# Patient Record
Sex: MALE | Race: WHITE | NOT HISPANIC OR LATINO | Employment: FULL TIME | URBAN - METROPOLITAN AREA
[De-identification: names, ages, dates, MRNs, and addresses within clinical notes are randomized per-mention and may not be internally consistent; named-entity substitution may affect disease eponyms.]

---

## 2023-12-27 ENCOUNTER — OFFICE VISIT (OUTPATIENT)
Dept: GASTROENTEROLOGY | Facility: CLINIC | Age: 38
End: 2023-12-27
Payer: COMMERCIAL

## 2023-12-27 VITALS
SYSTOLIC BLOOD PRESSURE: 136 MMHG | DIASTOLIC BLOOD PRESSURE: 88 MMHG | HEIGHT: 68 IN | TEMPERATURE: 98.2 F | WEIGHT: 192.8 LBS | OXYGEN SATURATION: 97 % | BODY MASS INDEX: 29.22 KG/M2 | HEART RATE: 70 BPM

## 2023-12-27 DIAGNOSIS — R11.0 NAUSEA: ICD-10-CM

## 2023-12-27 DIAGNOSIS — R10.13 DYSPEPSIA: Primary | ICD-10-CM

## 2023-12-27 DIAGNOSIS — R05.9 COUGH, UNSPECIFIED TYPE: ICD-10-CM

## 2023-12-27 DIAGNOSIS — K21.9 GASTROESOPHAGEAL REFLUX DISEASE, UNSPECIFIED WHETHER ESOPHAGITIS PRESENT: ICD-10-CM

## 2023-12-27 PROCEDURE — 99204 OFFICE O/P NEW MOD 45 MIN: CPT | Performed by: NURSE PRACTITIONER

## 2023-12-27 RX ORDER — SUCRALFATE 1 G/1
1 TABLET ORAL 3 TIMES DAILY
Qty: 270 TABLET | Refills: 3 | Status: SHIPPED | OUTPATIENT
Start: 2023-12-27

## 2023-12-27 RX ORDER — TADALAFIL 20 MG/1
10-20 TABLET ORAL
COMMUNITY
Start: 2022-12-30 | End: 2023-12-30

## 2023-12-27 RX ORDER — DEXLANSOPRAZOLE 60 MG/1
60 CAPSULE, DELAYED RELEASE ORAL DAILY
Qty: 30 CAPSULE | Refills: 5 | Status: SHIPPED | OUTPATIENT
Start: 2023-12-27

## 2023-12-27 RX ORDER — FLUVOXAMINE MALEATE 25 MG
100 TABLET ORAL
COMMUNITY
End: 2023-12-27

## 2023-12-27 RX ORDER — ATORVASTATIN CALCIUM 20 MG/1
20 TABLET, FILM COATED ORAL DAILY
COMMUNITY
Start: 2023-10-23 | End: 2023-12-27

## 2023-12-27 NOTE — PROGRESS NOTES
Chief Complaint   Patient presents with    Heartburn       HPI    Bruce Abdalla is a  38 y.o. male here with a history of hypertension to establish care as a new patient for complaints of dyspepsia.    This patient will also follow with Dr. Soto.    This patient was following with Dr. Rossi up until the summer 2022.  He had an EGD 7/2022 reviewed with normal findings.  Pathology negative for H. pylori.    Patient reports approximately 6 years of dyspeptic symptoms with chronic cough.  He has been evaluated by ENT and per his accounts workup was normal.  The last time he saw ear nose and throat was approximately 5 years ago.  He was placed on twice daily Protonix following EGD last summer but was able to taper down to as needed.  He had return of symptoms over the last several months with severe worsening of cough which prompted him to seek treatment in urgent care earlier this month.  He was treated with Carafate which he has found very helpful.  Mild nausea without vomiting.  No dysphagia or odynophagia.  He has been reading up about antireflux surgery but prefers to avoid surgical intervention if able.  No lower GI complaints.  No family history of colon cancer.    He works as a nurse here on the fifth floor.    Past Medical History:   Diagnosis Date    GERD (gastroesophageal reflux disease)     Hypertension        Past Surgical History:   Procedure Laterality Date    HERNIA REPAIR      UPPER GASTROINTESTINAL ENDOSCOPY  2022    [facility]       Scheduled Meds:     Continuous Infusions: No current facility-administered medications for this visit.      PRN Meds:     Allergies   Allergen Reactions    Bee Venom Swelling    Influenza Virus Vac Live Quad Hives       Social History     Socioeconomic History    Marital status:    Tobacco Use    Smoking status: Never    Smokeless tobacco: Never   Vaping Use    Vaping Use: Never used   Substance and Sexual Activity    Alcohol use: Never    Drug use: Never        Family History   Problem Relation Age of Onset    Heart disease Mother     Diabetes Father     Heart disease Father        Review of Systems   Respiratory:  Positive for cough.    Gastrointestinal:  Positive for nausea.        + Dyspepsia       Vitals:    12/27/23 1342   BP: 136/88   Pulse: 70   Temp: 98.2 °F (36.8 °C)   SpO2: 97%       Physical Exam  Constitutional:       Appearance: He is well-developed.   Abdominal:      General: Bowel sounds are normal. There is no distension.      Palpations: Abdomen is soft. There is no mass.      Tenderness: There is no abdominal tenderness. There is no guarding.      Hernia: No hernia is present.   Skin:     General: Skin is warm and dry.      Capillary Refill: Capillary refill takes less than 2 seconds.   Neurological:      Mental Status: He is alert and oriented to person, place, and time.   Psychiatric:         Behavior: Behavior normal.     Assessment    Diagnoses and all orders for this visit:    1. Dyspepsia (Primary)  -     FL Upper GI With Air Contrast; Future    2. Gastroesophageal reflux disease, unspecified whether esophagitis present  -     sucralfate (CARAFATE) 1 g tablet; Take 1 tablet by mouth 3 (Three) Times a Day.  Dispense: 270 tablet; Refill: 3    3. Cough, unspecified type  -     FL Upper GI With Air Contrast; Future    4. Nausea  -     FL Upper GI With Air Contrast; Future    Other orders  -     dexlansoprazole (Dexilant) 60 MG capsule; Take 1 capsule by mouth Daily.  Dispense: 30 capsule; Refill: 5    Plan    Schedule upper GI series to assess the extent of reflux  Stop Protonix  Trial of Dexilant 60 mg once daily  Continue Carafate refill provided  Antireflux measures and dietary modifications reviewed. Low acid diet reviewed. Keep head of bed elevated. Stop eating/drinking at least 3 hours prior to bedtime. Eliminate caffeine and carbonated beverages.  Weight loss encouraged if BMI over 25.  Further recommendations pending imaging           BHARAT Key  [facility]  [address] [place] [address]  Office: [phone]

## 2024-02-06 ENCOUNTER — HOSPITAL ENCOUNTER (OUTPATIENT)
Dept: GENERAL RADIOLOGY | Facility: HOSPITAL | Age: 39
Discharge: HOME | End: 2024-02-06
Admitting: NURSE PRACTITIONER
Payer: COMMERCIAL

## 2024-02-06 DIAGNOSIS — R10.13 DYSPEPSIA: ICD-10-CM

## 2024-02-06 DIAGNOSIS — R05.9 COUGH, UNSPECIFIED TYPE: ICD-10-CM

## 2024-02-06 DIAGNOSIS — R11.0 NAUSEA: ICD-10-CM

## 2024-02-06 PROCEDURE — 74246 X-RAY XM UPR GI TRC 2CNTRST: CPT

## 2024-02-06 RX ADMIN — BARIUM SULFATE 135 ML: 980 POWDER, FOR SUSPENSION ORAL at 10:49

## 2024-02-06 RX ADMIN — ANTACID/ANTIFLATULENT 1 PACKET: 380; 550; 10; 10 GRANULE, EFFERVESCENT ORAL at 10:49

## 2024-02-07 NOTE — PROGRESS NOTES
Please inform the patient imaging shows moderately severe GERD.  No strictures noted.  How would he feel about an updated EGD with Dr. Soto?

## 2024-02-08 ENCOUNTER — TELEPHONE (OUTPATIENT)
Dept: GASTROENTEROLOGY | Facility: CLINIC | Age: 39
End: 2024-02-08
Payer: COMMERCIAL

## 2024-02-08 RX ORDER — PANTOPRAZOLE SODIUM 40 MG/1
40 TABLET, DELAYED RELEASE ORAL 2 TIMES DAILY
Qty: 180 TABLET | Refills: 3 | Status: SHIPPED | OUTPATIENT
Start: 2024-02-08

## 2024-02-08 NOTE — TELEPHONE ENCOUNTER
Called pt and advised of Jessica PERSON's note.  Pt verbalized understanding.    Pt states he would like to talk to RAZA Lopez prior to scheduling an EGD.     F/u appt w/RAZA Lopez 3/6 @ 2pm.     Update sent to RAZA Lopez.

## 2024-02-08 NOTE — TELEPHONE ENCOUNTER
----- Message from BHARAT Key sent at 2/7/2024  3:39 PM EST -----  Please inform the patient imaging shows moderately severe GERD.  No strictures noted.  How would he feel about an updated EGD with Dr. Soto?

## 2024-02-19 ENCOUNTER — TELEPHONE (OUTPATIENT)
Dept: GASTROENTEROLOGY | Facility: CLINIC | Age: 39
End: 2024-02-19
Payer: COMMERCIAL

## 2024-02-19 NOTE — TELEPHONE ENCOUNTER
Called patients. He states he questions are     What is being looked for and what is she thinking could be diagnosis.  What is the treatment plan if what you are looking for is found.     Patient said he is not opposed to the EGD just wanted to ask the questions first.

## 2024-02-19 NOTE — TELEPHONE ENCOUNTER
Hub staff attempted to follow warm transfer process and was unsuccessful     Caller: Bruce Abdalla    Relationship to patient: Self    Best call back number: [phone]    Patient is needing: PT IS RETURNING CALL TO [facility] REGARDING THE QUESTIONS ABOUT THE EGD.     REQUESTING CALL BACK PLEASE    ORIGINAL MESSAGE 2/8/24

## 2024-02-21 ENCOUNTER — TELEPHONE (OUTPATIENT)
Dept: GASTROENTEROLOGY | Facility: CLINIC | Age: 39
End: 2024-02-21
Payer: COMMERCIAL

## 2024-02-21 DIAGNOSIS — R11.0 NAUSEA: ICD-10-CM

## 2024-02-21 DIAGNOSIS — K21.9 GASTROESOPHAGEAL REFLUX DISEASE, UNSPECIFIED WHETHER ESOPHAGITIS PRESENT: Primary | ICD-10-CM

## 2024-02-21 DIAGNOSIS — R93.3 ABNORMAL FINDING ON GI TRACT IMAGING: ICD-10-CM

## 2024-02-21 DIAGNOSIS — R05.9 COUGH, UNSPECIFIED TYPE: ICD-10-CM

## 2024-02-21 DIAGNOSIS — R31.9 HEMATURIA, UNSPECIFIED TYPE: ICD-10-CM

## 2024-02-21 DIAGNOSIS — R10.13 DYSPEPSIA: ICD-10-CM

## 2024-02-21 NOTE — TELEPHONE ENCOUNTER
Spoke with the patient over the phone regarding symptoms and imaging.  Please schedule him for an EGD with Dr. Soto.  He also asked that I check his urine for blood as he had a urinalysis back in December which showed hematuria.  UA orders are in.

## 2024-02-21 NOTE — TELEPHONE ENCOUNTER
URIEL SALEH IN SCHEDULING PT SCHEDULED 06/10/2024 ARRIVING AT 2:30PM EGD PREP INSTRUCTIONS MAILED TO ADDRESS ON FILE VERIFIED BY THE PT.OK FOR THE HUB TO READ

## 2024-03-22 ENCOUNTER — TELEPHONE (OUTPATIENT)
Dept: GASTROENTEROLOGY | Facility: CLINIC | Age: 39
End: 2024-03-22
Payer: COMMERCIAL

## 2024-06-10 ENCOUNTER — ANESTHESIA EVENT (OUTPATIENT)
Dept: GASTROENTEROLOGY | Facility: HOSPITAL | Age: 39
End: 2024-06-10
Payer: COMMERCIAL

## 2024-06-10 ENCOUNTER — HOSPITAL ENCOUNTER (OUTPATIENT)
Facility: HOSPITAL | Age: 39
Setting detail: HOSPITAL OUTPATIENT SURGERY
Discharge: HOME | End: 2024-06-10
Attending: INTERNAL MEDICINE | Admitting: INTERNAL MEDICINE
Payer: COMMERCIAL

## 2024-06-10 ENCOUNTER — ANESTHESIA (OUTPATIENT)
Dept: GASTROENTEROLOGY | Facility: HOSPITAL | Age: 39
End: 2024-06-10
Payer: COMMERCIAL

## 2024-06-10 VITALS
BODY MASS INDEX: 28.49 KG/M2 | RESPIRATION RATE: 16 BRPM | HEIGHT: 68 IN | WEIGHT: 188 LBS | DIASTOLIC BLOOD PRESSURE: 74 MMHG | SYSTOLIC BLOOD PRESSURE: 106 MMHG | OXYGEN SATURATION: 98 % | HEART RATE: 56 BPM

## 2024-06-10 DIAGNOSIS — R10.13 DYSPEPSIA: ICD-10-CM

## 2024-06-10 DIAGNOSIS — K21.9 GASTROESOPHAGEAL REFLUX DISEASE, UNSPECIFIED WHETHER ESOPHAGITIS PRESENT: ICD-10-CM

## 2024-06-10 DIAGNOSIS — R11.0 NAUSEA: ICD-10-CM

## 2024-06-10 DIAGNOSIS — R05.9 COUGH, UNSPECIFIED TYPE: ICD-10-CM

## 2024-06-10 DIAGNOSIS — R93.3 ABNORMAL FINDING ON GI TRACT IMAGING: ICD-10-CM

## 2024-06-10 PROCEDURE — 25010000002 PROPOFOL 1000 MG/100ML EMULSION: Performed by: NURSE ANESTHETIST, CERTIFIED REGISTERED

## 2024-06-10 PROCEDURE — 43239 EGD BIOPSY SINGLE/MULTIPLE: CPT | Performed by: INTERNAL MEDICINE

## 2024-06-10 PROCEDURE — 88342 IMHCHEM/IMCYTCHM 1ST ANTB: CPT | Performed by: INTERNAL MEDICINE

## 2024-06-10 PROCEDURE — 88305 TISSUE EXAM BY PATHOLOGIST: CPT | Performed by: INTERNAL MEDICINE

## 2024-06-10 PROCEDURE — S0260 H&P FOR SURGERY: HCPCS | Performed by: INTERNAL MEDICINE

## 2024-06-10 PROCEDURE — 25810000003 LACTATED RINGERS PER 1000 ML: Performed by: INTERNAL MEDICINE

## 2024-06-10 RX ORDER — IPRATROPIUM BROMIDE AND ALBUTEROL SULFATE 2.5; .5 MG/3ML; MG/3ML
3 SOLUTION RESPIRATORY (INHALATION) ONCE AS NEEDED
Status: DISCONTINUED | OUTPATIENT
Start: 2024-06-10 | End: 2024-06-10 | Stop reason: HOSPADM

## 2024-06-10 RX ORDER — DROPERIDOL 2.5 MG/ML
0.62 INJECTION, SOLUTION INTRAMUSCULAR; INTRAVENOUS
Status: DISCONTINUED | OUTPATIENT
Start: 2024-06-10 | End: 2024-06-10 | Stop reason: HOSPADM

## 2024-06-10 RX ORDER — LIDOCAINE HYDROCHLORIDE 20 MG/ML
INJECTION, SOLUTION INFILTRATION; PERINEURAL AS NEEDED
Status: DISCONTINUED | OUTPATIENT
Start: 2024-06-10 | End: 2024-06-10 | Stop reason: SURG

## 2024-06-10 RX ORDER — EPHEDRINE SULFATE 50 MG/ML
5 INJECTION, SOLUTION INTRAVENOUS ONCE AS NEEDED
Status: DISCONTINUED | OUTPATIENT
Start: 2024-06-10 | End: 2024-06-10 | Stop reason: HOSPADM

## 2024-06-10 RX ORDER — FLUMAZENIL 0.1 MG/ML
0.2 INJECTION INTRAVENOUS AS NEEDED
Status: DISCONTINUED | OUTPATIENT
Start: 2024-06-10 | End: 2024-06-10 | Stop reason: HOSPADM

## 2024-06-10 RX ORDER — PROMETHAZINE HYDROCHLORIDE 25 MG/1
25 SUPPOSITORY RECTAL ONCE AS NEEDED
Status: DISCONTINUED | OUTPATIENT
Start: 2024-06-10 | End: 2024-06-10 | Stop reason: HOSPADM

## 2024-06-10 RX ORDER — PROMETHAZINE HYDROCHLORIDE 25 MG/1
25 TABLET ORAL ONCE AS NEEDED
Status: DISCONTINUED | OUTPATIENT
Start: 2024-06-10 | End: 2024-06-10 | Stop reason: HOSPADM

## 2024-06-10 RX ORDER — HYDROCODONE BITARTRATE AND ACETAMINOPHEN 5; 325 MG/1; MG/1
1 TABLET ORAL ONCE AS NEEDED
Status: DISCONTINUED | OUTPATIENT
Start: 2024-06-10 | End: 2024-06-10 | Stop reason: HOSPADM

## 2024-06-10 RX ORDER — OXYCODONE AND ACETAMINOPHEN 7.5; 325 MG/1; MG/1
1 TABLET ORAL EVERY 4 HOURS PRN
Status: DISCONTINUED | OUTPATIENT
Start: 2024-06-10 | End: 2024-06-10 | Stop reason: HOSPADM

## 2024-06-10 RX ORDER — HYDRALAZINE HYDROCHLORIDE 20 MG/ML
5 INJECTION INTRAMUSCULAR; INTRAVENOUS
Status: DISCONTINUED | OUTPATIENT
Start: 2024-06-10 | End: 2024-06-10 | Stop reason: HOSPADM

## 2024-06-10 RX ORDER — SODIUM CHLORIDE, SODIUM LACTATE, POTASSIUM CHLORIDE, CALCIUM CHLORIDE 600; 310; 30; 20 MG/100ML; MG/100ML; MG/100ML; MG/100ML
1000 INJECTION, SOLUTION INTRAVENOUS CONTINUOUS
Status: DISCONTINUED | OUTPATIENT
Start: 2024-06-10 | End: 2024-06-10 | Stop reason: HOSPADM

## 2024-06-10 RX ORDER — HYDROMORPHONE HYDROCHLORIDE 2 MG/ML
0.5 INJECTION, SOLUTION INTRAMUSCULAR; INTRAVENOUS; SUBCUTANEOUS
Status: DISCONTINUED | OUTPATIENT
Start: 2024-06-10 | End: 2024-06-10 | Stop reason: HOSPADM

## 2024-06-10 RX ORDER — DIPHENHYDRAMINE HYDROCHLORIDE 50 MG/ML
12.5 INJECTION INTRAMUSCULAR; INTRAVENOUS
Status: DISCONTINUED | OUTPATIENT
Start: 2024-06-10 | End: 2024-06-10 | Stop reason: HOSPADM

## 2024-06-10 RX ORDER — NALOXONE HCL 0.4 MG/ML
0.2 VIAL (ML) INJECTION AS NEEDED
Status: DISCONTINUED | OUTPATIENT
Start: 2024-06-10 | End: 2024-06-10 | Stop reason: HOSPADM

## 2024-06-10 RX ORDER — FENTANYL CITRATE 50 UG/ML
50 INJECTION, SOLUTION INTRAMUSCULAR; INTRAVENOUS
Status: DISCONTINUED | OUTPATIENT
Start: 2024-06-10 | End: 2024-06-10 | Stop reason: HOSPADM

## 2024-06-10 RX ORDER — PROPOFOL 10 MG/ML
INJECTION, EMULSION INTRAVENOUS AS NEEDED
Status: DISCONTINUED | OUTPATIENT
Start: 2024-06-10 | End: 2024-06-10 | Stop reason: SURG

## 2024-06-10 RX ORDER — LABETALOL HYDROCHLORIDE 5 MG/ML
5 INJECTION, SOLUTION INTRAVENOUS
Status: DISCONTINUED | OUTPATIENT
Start: 2024-06-10 | End: 2024-06-10 | Stop reason: HOSPADM

## 2024-06-10 RX ORDER — ONDANSETRON 2 MG/ML
4 INJECTION INTRAMUSCULAR; INTRAVENOUS ONCE AS NEEDED
Status: DISCONTINUED | OUTPATIENT
Start: 2024-06-10 | End: 2024-06-10 | Stop reason: HOSPADM

## 2024-06-10 RX ADMIN — PROPOFOL INJECTABLE EMULSION 100 MG: 10 INJECTION, EMULSION INTRAVENOUS at 10:19

## 2024-06-10 RX ADMIN — SODIUM CHLORIDE, POTASSIUM CHLORIDE, SODIUM LACTATE AND CALCIUM CHLORIDE 1000 ML: 600; 310; 30; 20 INJECTION, SOLUTION INTRAVENOUS at 09:22

## 2024-06-10 RX ADMIN — PROPOFOL INJECTABLE EMULSION 140 MCG/KG/MIN: 10 INJECTION, EMULSION INTRAVENOUS at 10:20

## 2024-06-10 RX ADMIN — LIDOCAINE HYDROCHLORIDE 60 MG: 20 INJECTION, SOLUTION INFILTRATION; PERINEURAL at 10:15

## 2024-06-10 NOTE — ANESTHESIA PREPROCEDURE EVALUATION
Anesthesia Evaluation     Patient summary reviewed and Nursing notes reviewed                Airway   Mallampati: II  TM distance: >3 FB  Neck ROM: full  Dental      Pulmonary - negative pulmonary ROS   Cardiovascular     Patient on routine beta blocker  Rhythm: regular  Rate: normal    (+) hypertension      Neuro/Psych- negative ROS  GI/Hepatic/Renal/Endo    (+) obesity, GERD    Musculoskeletal (-) negative ROS    Abdominal    Substance History - negative use     OB/GYN negative ob/gyn ROS         Other                      Anesthesia Plan    ASA 2     MAC     intravenous induction     Anesthetic plan, risks, benefits, and alternatives have been provided, discussed and informed consent has been obtained with: patient.    CODE STATUS:

## 2024-06-10 NOTE — ANESTHESIA POSTPROCEDURE EVALUATION
Patient: Bruce Abdalla    Procedure Summary       Date: 06/10/24 Room / Location: [facility] ENDOSCOPY 8 / [facility] ENDOSCOPY    Anesthesia Start: 1013 Anesthesia Stop: 1036    Procedure: ESOPHAGOGASTRODUODENOSCOPY with cold forcep biopsies (Esophagus) Diagnosis:       Gastroesophageal reflux disease, unspecified whether esophagitis present      Dyspepsia      Cough, unspecified type      Nausea      Abnormal finding on GI tract imaging      Gastritis      (Gastroesophageal reflux disease, unspecified whether esophagitis present [K21.9])      (Dyspepsia [R10.13])      (Cough, unspecified type [R05.9])      (Nausea [R11.0])      (Abnormal finding on GI tract imaging [R93.3])    Surgeons: Castro Soto MD Provider: Segun Neal MD    Anesthesia Type: MAC ASA Status: 2            Anesthesia Type: MAC    Vitals  Vitals Value Taken Time   /79 06/10/24 1033   Temp     Pulse 59 06/10/24 1033   Resp 16 06/10/24 1033   SpO2 97 % 06/10/24 1033           Post Anesthesia Care and Evaluation    Patient location during evaluation: PACU  Patient participation: complete - patient participated  Level of consciousness: awake and alert  Pain management: adequate    Airway patency: patent  Anesthetic complications: No anesthetic complications    Cardiovascular status: acceptable  Respiratory status: acceptable  Hydration status: acceptable    Comments: --------------------            06/10/24               1033     --------------------   BP:       109/79     Pulse:      59       Resp:       16       SpO2:      97%      --------------------

## 2024-06-10 NOTE — BRIEF OP NOTE
ESOPHAGOGASTRODUODENOSCOPY  Progress Note    Bruce Abdalla  6/10/2024    Pre-op Diagnosis:   Gastroesophageal reflux disease, unspecified whether esophagitis present [K21.9]  Dyspepsia [R10.13]  Cough, unspecified type [R05.9]  Nausea [R11.0]  Abnormal finding on GI tract imaging [R93.3]       Post-Op Diagnosis Codes:     * Gastroesophageal reflux disease, unspecified whether esophagitis present [K21.9]     * Dyspepsia [R10.13]     * Cough, unspecified type [R05.9]     * Nausea [R11.0]     * Abnormal finding on GI tract imaging [R93.3]     * Gastritis [K29.70]    Procedure/CPT® Codes:        Procedure(s):  ESOPHAGOGASTRODUODENOSCOPY with cold forcep biopsies              Surgeon(s):  Castro Soto MD    Anesthesia: Monitored Anesthesia Care    Staff:   Endo Technician: Addis Peters  Endo Nurse: Angelique Velazquez RN         Estimated Blood Loss: minimal    Urine Voided: * No values recorded between 6/10/2024 10:13 AM and 6/10/2024 10:28 AM *    Specimens:                Specimens       ID Source Type Tests Collected By Collected At Frozen?    A Gastric, Antrum Tissue TISSUE PATHOLOGY EXAM   Castro Soto MD 6/10/24 1026 No    Description: biposies antral    This specimen was not marked as sent.                  Drains: * No LDAs found *    Findings: EGD revealed normal esophageal mucosa as well as duodenal mucosa.  Retroflexed view the GE junction was normal with mild antral gastritis noted biopsies of the antrum were obtained.        Complications: None          Castro Soto MD     Date: 6/10/2024  Time: 10:30 EDT

## 2024-06-10 NOTE — H&P
[facility]  Pre Procedure History & Physical    Chief Complaint:   GERD    Subjective     HPI:   Patient 39-year-old male with history of hypertension and GERD here for evaluation.  Patient with worsening of his baseline GERD though has noted improvement since restarting pantoprazole here for EGD.    Past Medical History:   Past Medical History:   Diagnosis Date    GERD (gastroesophageal reflux disease)     Hypertension        Past Surgical History:  Past Surgical History:   Procedure Laterality Date    HERNIA REPAIR      UPPER GASTROINTESTINAL ENDOSCOPY  2022    [facility]       Family History:  Family History   Problem Relation Age of Onset    Heart disease Mother     Diabetes Father     Heart disease Father     Malig Hyperthermia Neg Hx        Social History:   reports that he has never smoked. He has never used smokeless tobacco. He reports that he does not drink alcohol and does not use drugs.    Medications:   Medications Prior to Admission   Medication Sig Dispense Refill Last Dose    amphetamine-dextroamphetamine (Adderall) 20 MG tablet Take 0.5 tablets by mouth 2 (Two) Times a Day. 30 tablet 0 6/9/2024    atenolol (TENORMIN) 25 MG tablet Take 1 tablet by mouth Daily. 90 tablet 3 6/10/2024    cloNIDine (CATAPRES) 0.2 MG tablet Take 1 tablet by mouth every night at bedtime. 30 tablet 4 6/9/2024    Daridorexant HCl (Quviviq) 50 MG tablet TAKE 1 TABLET BY MOUTH AT BEDTIME 30 tablet 2 6/9/2024    pantoprazole (PROTONIX) 40 MG EC tablet Take 1 tablet by mouth 2 (Two) Times a Day. 180 tablet 3 6/9/2024    sucralfate (CARAFATE) 1 g tablet Take 1 tablet by mouth 3 (Three) Times a Day. 270 tablet 3 Past Week    SUMAtriptan (IMITREX) 100 MG tablet Take 1 tablet by mouth at onset of migraine. May repeat in 2 hours if needed. Max 2 tablets/24 hours 9 tablet 1 Past Week    tadalafil (CIALIS) 20 MG tablet Take 10-20 mg by mouth.   Past Month    betamethasone dipropionate (DIPROSONE) 0.05 % cream  "Apply 1 Application topically to the appropriate area as directed 2 (Two) Times a Day. 30 g 1 6/8/2024    traZODone (DESYREL) 50 MG tablet Take 0.5-1 tablets by mouth every night at bedtime as needed for insomnia 30 tablet 5 6/8/2024       Allergies:  Bee venom and Influenza virus vac live quad    ROS:    Pertinent items are noted in HPI     Objective     Blood pressure 112/87, pulse 66, resp. rate 16, height 172.7 cm (68\"), weight 85.3 kg (188 lb), SpO2 98%.    Physical Exam   Constitutional: Pt is oriented to person, place, and time and well-developed, well-nourished, and in no distress.   Mouth/Throat: Oropharynx is clear and moist.   Neck: Normal range of motion.   Cardiovascular: Normal rate, regular rhythm and normal heart sounds.    Pulmonary/Chest: Effort normal and breath sounds normal.   Abdominal: Soft. Nontender  Skin: Skin is warm and dry.   Psychiatric: Mood, memory, affect and judgment normal.     Assessment & Plan     Diagnosis:  GERD    Anticipated Surgical Procedure:  EGD    The risks, benefits, and alternatives of this procedure have been discussed with the patient or the responsible party- the patient understands and agrees to proceed.                                                          "

## 2024-06-12 LAB
LAB AP CASE REPORT: NORMAL
LAB AP SPECIAL STAINS: NORMAL
PATH REPORT.FINAL DX SPEC: NORMAL
PATH REPORT.GROSS SPEC: NORMAL

## 2024-07-11 ENCOUNTER — TELEPHONE (OUTPATIENT)
Dept: GASTROENTEROLOGY | Facility: CLINIC | Age: 39
End: 2024-07-11
Payer: COMMERCIAL

## 2024-07-11 RX ORDER — LANSOPRAZOLE 30 MG/1
30 CAPSULE, DELAYED RELEASE ORAL DAILY
Qty: 90 CAPSULE | Refills: 3 | Status: SHIPPED | OUTPATIENT
Start: 2024-07-11

## 2024-07-11 NOTE — TELEPHONE ENCOUNTER
Patient called. Advised as per Dr. Soto's note. He verb understanding. Switch patient from Pantoprazole to Lansoprazole. Patient verb understanding.

## 2024-07-11 NOTE — TELEPHONE ENCOUNTER
----- Message from Castro Soto sent at 7/11/2024  1:56 PM EDT -----  Erosive gastritis, change PPI to one not tried before.  Check with patient

## 2024-08-30 ENCOUNTER — TELEPHONE (OUTPATIENT)
Dept: GASTROENTEROLOGY | Facility: CLINIC | Age: 39
End: 2024-08-30
Payer: COMMERCIAL

## 2024-08-30 RX ORDER — PANTOPRAZOLE SODIUM 40 MG/1
40 TABLET, DELAYED RELEASE ORAL DAILY
Qty: 90 TABLET | Refills: 3 | Status: SHIPPED | OUTPATIENT
Start: 2024-08-30

## 2024-08-30 NOTE — TELEPHONE ENCOUNTER
----- Message from Jessica Moscoso sent at 8/30/2024  1:38 PM EDT -----  Regarding: FW: PREVACID not working  Contact: [phone]  Protonix sent to his pharmacy schedule him for a follow-up as well      ----- Message -----  From: Irasema Royal RN  Sent: 8/29/2024   8:09 AM EDT  To: BHARAT Key  Subject: FW: PREVACID not working                           ----- Message -----  From: Bruce Abdalla  Sent: 8/28/2024   2:40 PM EDT  To: [facility]  Subject: PREVACID not working                             Isra Lopez,  I was switched to Prevacid about 2 months ago from my protonix and I don't think it is working well.  My cough came back pretty bad along with heart burn about 3 weeks after switching so I had to go back to using the carafate 4 times a day. I got it under control but as soon as I cut back on the carafate the symptoms returned so I started taking my protonix again. I am still using the carafate occasionally with the protonix but overall it seems to be working better the last 2 weeks. However, I am about to run out of protonix so unless you all want to try me on something else, I will need some refills for the protonix please.    Thanks  Bruce Abdalla  [phone]

## 2024-09-03 ENCOUNTER — TELEPHONE (OUTPATIENT)
Dept: GASTROENTEROLOGY | Facility: CLINIC | Age: 39
End: 2024-09-03
Payer: COMMERCIAL

## 2024-10-13 ENCOUNTER — APPOINTMENT (OUTPATIENT)
Dept: CT IMAGING | Facility: HOSPITAL | Age: 39
End: 2024-10-13
Payer: COMMERCIAL

## 2024-10-13 ENCOUNTER — HOSPITAL ENCOUNTER (EMERGENCY)
Facility: HOSPITAL | Age: 39
Discharge: HOME | End: 2024-10-13
Attending: EMERGENCY MEDICINE | Admitting: EMERGENCY MEDICINE
Payer: COMMERCIAL

## 2024-10-13 VITALS
BODY MASS INDEX: 27.28 KG/M2 | DIASTOLIC BLOOD PRESSURE: 110 MMHG | RESPIRATION RATE: 18 BRPM | WEIGHT: 180 LBS | HEART RATE: 84 BPM | HEIGHT: 68 IN | TEMPERATURE: 98 F | OXYGEN SATURATION: 98 % | SYSTOLIC BLOOD PRESSURE: 148 MMHG

## 2024-10-13 DIAGNOSIS — I10 ELEVATED BLOOD PRESSURE READING WITH DIAGNOSIS OF HYPERTENSION: ICD-10-CM

## 2024-10-13 DIAGNOSIS — R59.0 LYMPHADENOPATHY OF HEAD AND NECK REGION: ICD-10-CM

## 2024-10-13 DIAGNOSIS — R20.0 LEFT FACIAL NUMBNESS: Primary | ICD-10-CM

## 2024-10-13 PROCEDURE — 99284 EMERGENCY DEPT VISIT MOD MDM: CPT

## 2024-10-13 PROCEDURE — 70450 CT HEAD/BRAIN W/O DYE: CPT

## 2024-10-13 PROCEDURE — 99283 EMERGENCY DEPT VISIT LOW MDM: CPT | Performed by: NURSE PRACTITIONER

## 2024-10-13 NOTE — DISCHARGE INSTRUCTIONS
CT head normal today.  Due to the numbness being present for a few weeks this is NOT worrisome for acute stroke.  Your neurological exam is normal.    Follow up with Primary Care Provider    Please keep eye on your blood pressure.    Return Precautions    Although you are being discharged from the ED today, I encourage you to return for worsening symptoms.  Things can, and do, change such that treatment at home with medication may not be adequate.      Specifically, return for any of the following:    Chest pain, shortness of breath, pain or nausea and vomiting not controlled by medications provided.    Please make a follow up with your Primary Care Provider for a blood pressure recheck.

## 2024-10-13 NOTE — FSED PROVIDER NOTE
EMERGENCY DEPARTMENT ENCOUNTER    Room Number:  05/05  Date seen:  10/13/2024  Time seen: 08:54 EDT  PCP: Lissy Brown PA  Historian: patient    Discussed/obtained information from independent historians: n/a    HPI:  Chief complaint:lymph node swelling, facial numbness  A complete HPI/ROS/PMH/PSH/SH/FH are unobtainable due to: n/a  Context:Bruec Abdalla is a 39 y.o. male with history of GERD, hypertension who presents to the ED with c/o a couple weeks of left facial numbness that is intermittent as well as a swollen and tender lymph node to back of left occipital area.  It is or not made better or worse by anything.  He denies any pain, headache, sore throat, URI symptoms, sinus congestion, unilateral weakness, visual changes.  He states he was seen the other day at his primary care provider and told just to watch it.  Today he became more concerned and wanted to make sure he did not have a brain tumor or that he was having a stroke.  He has not had these problems before.    External (non-ED) record review: Patient is status post EGD on 6/10/2024.  Postop diagnosis code include GERD, dyspepsia, cough, nausea and gastritis    Chronic or social conditions impacting care: Not applicable    ALLERGIES  Bee venom and Influenza virus vac live quad    PAST MEDICAL HISTORY  Active Ambulatory Problems     Diagnosis Date Noted    Gastroesophageal reflux disease 02/21/2024    Dyspepsia 02/21/2024    Cough 02/21/2024    Nausea 02/21/2024    Abnormal finding on GI tract imaging 02/21/2024     Resolved Ambulatory Problems     Diagnosis Date Noted    No Resolved Ambulatory Problems     Past Medical History:   Diagnosis Date    GERD (gastroesophageal reflux disease)     Hypertension        PAST SURGICAL HISTORY  Past Surgical History:   Procedure Laterality Date    ENDOSCOPY N/A 6/10/2024    Procedure: ESOPHAGOGASTRODUODENOSCOPY with cold forcep biopsies;  Surgeon: Castro Soto MD;  Location: [facility] ENDOSCOPY;   Service: Gastroenterology;  Laterality: N/A;  pre reflux  post gastritis    HERNIA REPAIR      UPPER GASTROINTESTINAL ENDOSCOPY  2022    [facility]       FAMILY HISTORY  Family History   Problem Relation Age of Onset    Heart disease Mother     Diabetes Father     Heart disease Father     Malig Hyperthermia Neg Hx        SOCIAL HISTORY  Social History     Socioeconomic History    Marital status:    Tobacco Use    Smoking status: Never    Smokeless tobacco: Never   Vaping Use    Vaping status: Never Used   Substance and Sexual Activity    Alcohol use: Never    Drug use: Never    Sexual activity: Defer       REVIEW OF SYSTEMS  Review of Systems    All systems reviewed and negative except for those discussed in HPI.     PHYSICAL EXAM    I have reviewed the triage vital signs and nursing notes.  Vitals:    10/13/24 0846   BP: (!) 148/110   Pulse: 75   Resp: 18   Temp: 98 °F (36.7 °C)   SpO2: 100%     Physical Exam    GENERAL: not distressed  HENT: nares patent, mucous membranes are moist.  There is no facial droop.  No tonsillar erythema, edema or exudates.  Voice is normal.  The tympanic membranes are normal.  There is perhaps a very tiny left occipital lymph node  EYES: no scleral icterus, PERRL, EOMI  NECK: no ROM limitations  CV: regular rhythm, regular rate  RESPIRATORY: normal effort, clear to auscultate bilaterally  ABDOMEN: soft  : deferred  MUSCULOSKELETAL: no deformity  NEURO: alert, moves all extremities, follows commands.  No weakness no drift no ataxia  SKIN: warm, dry    RADIOLOGY RESULTS  CT Head Without Contrast    Result Date: 10/13/2024  CT HEAD WO CONTRAST-  INDICATION: Occipital swelling, left facial numbness  COMPARISON: None  TECHNIQUE: Routine CT head without IV contrast. Coronal and sagittal reformats. Radiation dose reduction techniques were utilized, including automated exposure control and exposure modulation based on body size.  FINDINGS:  Brain: No intraparenchymal hemorrhage.  Preserved gray-white differentiation. No mass effect or midline shift.  Ventricles: No hydrocephalus. No intraventricular hemorrhage.  Extra-axial spaces: No extra-axial hemorrhage or fluid collection.  Osseous structures: Hyperostosis frontalis interna. No fracture.  Sinuses: Patent mastoid air cells and middle ears. Patent paranasal sinuses.      No acute intracranial process.  This report was finalized on 10/13/2024 9:19 AM by Dr. Sourav Correa M.D on Workstation: [id number]        Ordered the above noted radiological studies.  Independently interpreted by me.  My findings will be discussed in the medical decision section below.     PROGRESS, DATA ANALYSIS, CONSULTS AND MEDICAL DECISION MAKING    Please note that this section constitutes my independent interpretation of clinical data including lab results, radiology, EKG's.  This constitutes my independent professional opinion regarding differential diagnosis and management of this patient.  It may include any factors such as history from outside sources, review of external records, social determinants of health, management of medications, response to those treatments, and discussions with other providers.       Orders placed during this visit:  Orders Placed This Encounter   Procedures    CT Head Without Contrast    ED Acknowledgement Form Needed;            Medical Decision Making  Patient presents with several weeks of intermittent left-sided facial numbness, mild discomfort and also an occipital lymph node on the left that has been bothering the patient.  The patient is concern for stroke.  He has a normal, nonfocal neurological exam.  Given that symptoms have been present for several weeks he is obviously outside of any window for tPA/TNK and he has no other symptoms at all of stroke including visual deficits, unilateral weakness, problems thinking or speaking or headache.  Will have him follow-up with his primary care provider.  I did consider Bell's  palsy but he does not have this on exam.  CT head performed and unremarkable.  He has no symptoms of upper respiratory infection, sinusitis or viral syndrome.        DIAGNOSIS  Final diagnoses:   Left facial numbness, intermittent   Lymphadenopathy of head and neck region   Elevated blood pressure reading with diagnosis of hypertension          Medication List      No changes were made to your prescriptions during this visit.         FOLLOW-UP  Lissy Brown PA  [address]  [address]  [facility] [address]  [phone]    Schedule an appointment as soon as possible for a visit in 2 days  As needed, If symptoms worsen        Latest Documented Vital Signs:  As of 09:27 EDT  BP- (!) 148/110 HR- 75 Temp- 98 °F (36.7 °C) (Oral) O2 sat- 100%    Appropriate PPE utilized throughout this patient encounter to include mask, if indicated, per current protocol. Hand hygiene was performed before donning PPE and after removal when leaving the room.    Please note that portions of this were completed with a voice recognition program.     Note Disclaimer: At [facility], we believe that sharing information builds trust and better relationships. You are receiving this note because you are receiving care at [facility] or recently visited. It is possible you will see health information before a provider has talked with you about it. This kind of information can be easy to misunderstand. To help you fully understand what it means for your health, we urge you to discuss this note with your provider.

## 2024-10-16 ENCOUNTER — TRANSCRIBE ORDERS (OUTPATIENT)
Dept: ADMINISTRATIVE | Facility: HOSPITAL | Age: 39
End: 2024-10-16
Payer: COMMERCIAL

## 2024-10-16 DIAGNOSIS — R20.0 LEFT FACIAL NUMBNESS: ICD-10-CM

## 2024-10-16 DIAGNOSIS — R59.1 LYMPHADENOPATHY: Primary | ICD-10-CM

## 2024-10-22 ENCOUNTER — HOSPITAL ENCOUNTER (OUTPATIENT)
Dept: ULTRASOUND IMAGING | Facility: HOSPITAL | Age: 39
Discharge: HOME | End: 2024-10-22
Admitting: PHYSICIAN ASSISTANT
Payer: COMMERCIAL

## 2024-10-22 DIAGNOSIS — R20.0 LEFT FACIAL NUMBNESS: ICD-10-CM

## 2024-10-22 DIAGNOSIS — R59.1 LYMPHADENOPATHY: ICD-10-CM

## 2024-10-22 PROCEDURE — 76536 US EXAM OF HEAD AND NECK: CPT

## 2025-01-03 DIAGNOSIS — K21.9 GASTROESOPHAGEAL REFLUX DISEASE, UNSPECIFIED WHETHER ESOPHAGITIS PRESENT: ICD-10-CM

## 2025-01-03 RX ORDER — SUCRALFATE 1 G/1
1 TABLET ORAL 3 TIMES DAILY
Qty: 270 TABLET | Refills: 3 | Status: CANCELLED | OUTPATIENT
Start: 2025-01-03

## 2025-02-04 DIAGNOSIS — K21.9 GASTROESOPHAGEAL REFLUX DISEASE, UNSPECIFIED WHETHER ESOPHAGITIS PRESENT: ICD-10-CM

## 2025-02-05 RX ORDER — SUCRALFATE 1 G/1
1 TABLET ORAL 3 TIMES DAILY
Qty: 270 TABLET | Refills: 3 | OUTPATIENT
Start: 2025-02-05

## 2025-03-06 ENCOUNTER — TELEPHONE (OUTPATIENT)
Dept: GASTROENTEROLOGY | Facility: CLINIC | Age: 40
End: 2025-03-06
Payer: COMMERCIAL

## 2025-03-06 DIAGNOSIS — K21.9 GASTROESOPHAGEAL REFLUX DISEASE, UNSPECIFIED WHETHER ESOPHAGITIS PRESENT: ICD-10-CM

## 2025-03-06 NOTE — TELEPHONE ENCOUNTER
Hub staff attempted to follow warm transfer process and was unsuccessful     Caller: Bruce Abdalla    Relationship to patient: Self    Best call back number:  [phone]    Patient is needing: PT IS RETURNING CALL TO OFFICE REGARDING MEDICATION CARAFATE, PT STATES THAT HE IS USING THE MEDICATION AND IS NEED OF A REFILL.

## 2025-03-07 NOTE — TELEPHONE ENCOUNTER
Called pt, he states he still takes sucralfate several times per week for reflux sx.  States he is still having a cough r/t reflux some mornings. Pt takes pantoprazole at night, states he feels like it works best for nighttime sx.  Pt reports overall feeling better on pantoprazole, but having daytime reflux sx.  Pt states in the past he has tried nexium w/o relief and Dexilant was not covered by ins.  Advised will send a msg to RAZA Lopez.

## 2025-03-10 RX ORDER — SUCRALFATE 1 G/1
1 TABLET ORAL 3 TIMES DAILY
Qty: 270 TABLET | Refills: 3 | Status: SHIPPED | OUTPATIENT
Start: 2025-03-10

## 2025-03-10 NOTE — TELEPHONE ENCOUNTER
Ok for the hub to relay and schedule.  Left vm for the pt to call back and schedule an annual follow up appt with Jessica Moscoso.  Due in June

## 2025-08-13 RX ORDER — DOXYCYCLINE 100 MG/1
100 CAPSULE ORAL 2 TIMES DAILY
Qty: 20 CAPSULE | Refills: 0 | Status: SHIPPED | OUTPATIENT
Start: 2025-08-13 | End: 2025-08-23

## (undated) DEVICE — BLCK/BITE BLOX W/DENTL/RIM W/STRAP 54F

## (undated) DEVICE — FRCP BX RADJAW4 NDL 2.8 240CM LG OG BX40

## (undated) DEVICE — ADAPT CLN BIOGUARD AIR/H2O DISP

## (undated) DEVICE — TUBING, SUCTION, 1/4" X 10', STRAIGHT: Brand: MEDLINE

## (undated) DEVICE — SENSR O2 OXIMAX FNGR A/ 18IN NONSTR

## (undated) DEVICE — CANN O2 ETCO2 FITS ALL CONN CO2 SMPL A/ 7IN DISP LF

## (undated) DEVICE — KT ORCA ORCAPOD DISP STRL

## (undated) DEVICE — LN SMPL CO2 SHTRM SD STREAM W/M LUER